# Patient Record
Sex: FEMALE | Race: WHITE | NOT HISPANIC OR LATINO | URBAN - NONMETROPOLITAN AREA
[De-identification: names, ages, dates, MRNs, and addresses within clinical notes are randomized per-mention and may not be internally consistent; named-entity substitution may affect disease eponyms.]

---

## 2025-02-24 DIAGNOSIS — R91.1 LUNG NODULE: ICD-10-CM

## 2025-02-24 NOTE — PROGRESS NOTES
Bronchoscopy Scheduling Request    Pre-bronchoscopy visit: New patient visit with Bronchoscopy group provider  Please schedule procedure: ASAP    Cytology on-site:  Yes  Location:  Either location  Performing physician:  Advanced diagnostic bronchoscopist  Referring physician:   Brandie Cordova MD , Noah Martinez DO  Indication:  LAD, lung nodules   Sedation / Anesthesia:  GA  Procedure:  Dx EBUS, Staging EBUS  Time:  Tier 2  Fluorscopy:   No  Imaging needed:  None  Labs:  CBC, BMP; please see if had labs a Adonis that can be used for pre-procedure evaluation   Meds:  None  Special Considerations:  None Please clarify medication list.  History of prior non IP group non diagnostic EBUS.  Consider malignancy workup, core biopsies, other etiologies for LAD.    Reviewed by:  Bianca Gee MD

## 2025-02-27 DIAGNOSIS — Z01.818 PRE-OP TESTING: ICD-10-CM

## 2025-02-27 DIAGNOSIS — R91.1 LUNG NODULE: ICD-10-CM

## 2025-03-06 ENCOUNTER — APPOINTMENT (OUTPATIENT)
Dept: PULMONOLOGY | Facility: CLINIC | Age: 80
End: 2025-03-06
Payer: MEDICARE

## 2025-03-06 VITALS — BODY MASS INDEX: 38.45 KG/M2 | WEIGHT: 245 LBS | HEIGHT: 67 IN

## 2025-03-06 DIAGNOSIS — Z01.811 PREOP PULMONARY/RESPIRATORY EXAM: Primary | ICD-10-CM

## 2025-03-06 DIAGNOSIS — R91.8 LUNG NODULES: ICD-10-CM

## 2025-03-06 PROCEDURE — 1159F MED LIST DOCD IN RCRD: CPT | Performed by: INTERNAL MEDICINE

## 2025-03-06 PROCEDURE — 99213 OFFICE O/P EST LOW 20 MIN: CPT | Performed by: INTERNAL MEDICINE

## 2025-03-06 PROCEDURE — 1036F TOBACCO NON-USER: CPT | Performed by: INTERNAL MEDICINE

## 2025-03-06 RX ORDER — AMLODIPINE BESYLATE 10 MG/1
0.5 TABLET ORAL
COMMUNITY
Start: 2024-11-26

## 2025-03-06 RX ORDER — METFORMIN HYDROCHLORIDE 500 MG/1
500 TABLET ORAL
COMMUNITY

## 2025-03-06 RX ORDER — SITAGLIPTIN 100 MG/1
100 TABLET, FILM COATED ORAL DAILY
COMMUNITY

## 2025-03-06 RX ORDER — ACETAMINOPHEN 325 MG/1
325 TABLET ORAL EVERY 4 HOURS PRN
COMMUNITY

## 2025-03-06 RX ORDER — GLIMEPIRIDE 2 MG/1
1 TABLET ORAL
COMMUNITY
Start: 2024-11-22

## 2025-03-06 RX ORDER — POTASSIUM CHLORIDE 750 MG/1
20 CAPSULE, EXTENDED RELEASE ORAL 2 TIMES DAILY
COMMUNITY

## 2025-03-06 RX ORDER — HYDROCHLOROTHIAZIDE 12.5 MG/1
12.5 TABLET ORAL DAILY
COMMUNITY

## 2025-03-06 RX ORDER — METOPROLOL SUCCINATE 25 MG/1
25 TABLET, EXTENDED RELEASE ORAL DAILY
COMMUNITY

## 2025-03-06 RX ORDER — AMOXICILLIN 500 MG/1
CAPSULE ORAL
COMMUNITY
Start: 2024-04-01

## 2025-03-06 RX ORDER — AZELASTINE 1 MG/ML
2 SPRAY, METERED NASAL 2 TIMES DAILY
COMMUNITY

## 2025-03-06 RX ORDER — LISINOPRIL 40 MG/1
40 TABLET ORAL DAILY
COMMUNITY

## 2025-03-06 NOTE — PROGRESS NOTES
Subjective   Patient ID: Gala Marie is a 79 y.o. female who presents for pre bronchoscopy.  HPI  Patient was contacted by telephone at her current residence.  I reviewed with her the scheduling which is for bronchoscopy to be done at 3/14/2025 and she is to arrive there at [facility] [address] at 10:30 AM.  The actual endoscopy suite is in the [facility] room 1300.  Patient reports that she never smoked and rarely drinks any alcohol.  She is familiar with where the hospital is located.  Based on the PET scan results there are 2 lung nodules noted.  The subcarinal lymph node and the other is a  Left lingular subcentimeter pulmonary nodule.  The patient denies having a sore throat, hemoptysis or fever.  She also has not had any issues with angina or recent myocardial infarction or heart failure.  She also denies any recent exposure to pneumonia and no contact with COVID infections.  I answered all the patient's questions to her satisfaction.  Review of Systems  No change  Objective   Physical Exam  Not done today.  Assessment/Plan        1.  Preop pulmonary/respiratory exam.  2.  Lung nodules.        This note was transcribed using the Dragon Dictation system.  There may be grammatical, punctuation, or verbiage errors that occur with voice recognition programs.  Seamus Arrington,  03/06/25 3:52 PM

## 2025-03-06 NOTE — H&P (VIEW-ONLY)
Subjective   Patient ID: Gala Marie is a 79 y.o. female who presents for pre bronchoscopy.  HPI  Patient was contacted by telephone at her current residence.  I reviewed with her the scheduling which is for bronchoscopy to be done at 3/14/2025 and she is to arrive there at [facility] [address] at 10:30 AM.  The actual endoscopy suite is in the [facility] room 1300.  Patient reports that she never smoked and rarely drinks any alcohol.  She is familiar with where the hospital is located.  Based on the PET scan results there are 2 lung nodules noted.  The subcarinal lymph node and the other is a  Left lingular subcentimeter pulmonary nodule.  The patient denies having a sore throat, hemoptysis or fever.  She also has not had any issues with angina or recent myocardial infarction or heart failure.  She also denies any recent exposure to pneumonia and no contact with COVID infections.  I answered all the patient's questions to her satisfaction.  Review of Systems  No change  Objective   Physical Exam  Not done today.  Assessment/Plan        1.  Preop pulmonary/respiratory exam.  2.  Lung nodules.        This note was transcribed using the Dragon Dictation system.  There may be grammatical, punctuation, or verbiage errors that occur with voice recognition programs.  Seamus Arrington,  03/06/25 3:52 PM

## 2025-03-13 ENCOUNTER — ANESTHESIA EVENT (OUTPATIENT)
Dept: GASTROENTEROLOGY | Facility: HOSPITAL | Age: 80
End: 2025-03-13
Payer: MEDICARE

## 2025-03-13 PROBLEM — G47.33 OSA (OBSTRUCTIVE SLEEP APNEA): Status: ACTIVE | Noted: 2025-03-13

## 2025-03-13 PROBLEM — E11.9 DIABETES MELLITUS, TYPE 2 (MULTI): Status: ACTIVE | Noted: 2025-03-13

## 2025-03-13 PROBLEM — E78.5 HYPERLIPIDEMIA: Status: ACTIVE | Noted: 2025-03-13

## 2025-03-13 NOTE — ANESTHESIA PREPROCEDURE EVALUATION
Patient: Gala Marie    Procedure Information       Date/Time: 03/14/25 1130    Scheduled providers: Cyrus Thapa MD    Procedure: BRONCHOSCOPY    Location: [facility]            Relevant Problems   Anesthesia (within normal limits)  No family hx MH      Cardiac   (+) HTN (hypertension)   (+) Hyperlipidemia      Pulmonary (within normal limits)      Neuro (within normal limits)      GI (within normal limits)      /Renal   (+) Renal calculi      Liver (within normal limits)      Endocrine   (+) Diabetes mellitus, type 2 (Multi) (255)      Hematology (within normal limits)      Musculoskeletal (within normal limits)      HEENT (within normal limits)      ID (within normal limits)      Skin (within normal limits)      GYN (within normal limits)       Clinical information reviewed:                   NPO Detail:  No data recorded     Physical Exam    Airway  Mallampati: III  TM distance: >3 FB  Neck ROM: full     Cardiovascular - normal exam     Dental   Comments: intact   Pulmonary - normal exam     Abdominal          Vitals:    03/14/25 1017   BP: (!) 185/83   Pulse: 84   Resp: 17   Temp: 36.4 °C (97.5 °F)   SpO2: 96%       Past Surgical History:   Procedure Laterality Date    OTHER SURGICAL HISTORY  03/10/2022    Hysterectomy     Past Medical History:   Diagnosis Date    Diabetes mellitus (Multi)     Hyperlipidemia     Hypertension        Current Outpatient Medications:     acetaminophen (TylenoL) 325 mg tablet, Take 1 tablet (325 mg) by mouth every 4 hours if needed., Disp: , Rfl:     amLODIPine (Norvasc) 10 mg tablet, Take 0.5 tablets (5 mg) by mouth early in the morning.., Disp: , Rfl:     glimepiride (Amaryl) 2 mg tablet, Take 1 tablet (2 mg) by mouth early in the morning.., Disp: , Rfl:     hydroCHLOROthiazide (Microzide) 12.5 mg tablet, Take 1 tablet (12.5 mg) by mouth once daily., Disp: , Rfl:     Januvia 100 mg tablet, Take 1 tablet (100 mg) by mouth once daily., Disp: , Rfl:      lisinopril 40 mg tablet, Take 1 tablet (40 mg) by mouth once daily., Disp: , Rfl:     metFORMIN (Glucophage) 500 mg tablet, Take 1 tablet (500 mg) by mouth once daily., Disp: , Rfl:     metoprolol succinate XL (Toprol-XL) 25 mg 24 hr tablet, Take 1 tablet (25 mg) by mouth once daily. Do not crush or chew., Disp: , Rfl:     potassium chloride ER (Micro-K) 10 mEq ER capsule, Take 2 capsules (20 mEq) by mouth 2 times a day., Disp: , Rfl:     amoxicillin (Amoxil) 500 mg capsule, take 4 capsules by mouth AS A ONE TIME DOSE 1 HOUR PRIOR TO PROCEDURE (Patient not taking: Reported on 3/6/2025), Disp: , Rfl:     azelastine (Astelin) 137 mcg (0.1 %) nasal spray, Administer 2 sprays into each nostril 2 times a day., Disp: , Rfl:   Prior to Admission medications    Medication Sig Start Date End Date Taking? Authorizing Provider   acetaminophen (TylenoL) 325 mg tablet Take 1 tablet (325 mg) by mouth every 4 hours if needed.   Yes Historical Provider, MD   amLODIPine (Norvasc) 10 mg tablet Take 0.5 tablets (5 mg) by mouth early in the morning.. 11/26/24  Yes Historical Provider, MD   glimepiride (Amaryl) 2 mg tablet Take 1 tablet (2 mg) by mouth early in the morning.. 11/22/24  Yes Historical Provider, MD   hydroCHLOROthiazide (Microzide) 12.5 mg tablet Take 1 tablet (12.5 mg) by mouth once daily.   Yes Historical Provider, MD   Januvia 100 mg tablet Take 1 tablet (100 mg) by mouth once daily.   Yes Historical Provider, MD   lisinopril 40 mg tablet Take 1 tablet (40 mg) by mouth once daily.   Yes Historical Provider, MD   metFORMIN (Glucophage) 500 mg tablet Take 1 tablet (500 mg) by mouth once daily.   Yes Historical Provider, MD   metoprolol succinate XL (Toprol-XL) 25 mg 24 hr tablet Take 1 tablet (25 mg) by mouth once daily. Do not crush or chew.   Yes Historical Provider, MD   potassium chloride ER (Micro-K) 10 mEq ER capsule Take 2 capsules (20 mEq) by mouth 2 times a day.   Yes Historical Provider, MD   amoxicillin  "(Amoxil) 500 mg capsule take 4 capsules by mouth AS A ONE TIME DOSE 1 HOUR PRIOR TO PROCEDURE  Patient not taking: Reported on 3/6/2025 4/1/24   Historical Provider, MD   azelastine (Astelin) 137 mcg (0.1 %) nasal spray Administer 2 sprays into each nostril 2 times a day.    Historical Provider, MD     Allergies   Allergen Reactions    Doxycycline Other     Social History     Tobacco Use    Smoking status: Never    Smokeless tobacco: Never   Substance Use Topics    Alcohol use: Not Currently     Comment: occasionally         Chemistry    No results found for: \"NA\", \"K\", \"CL\", \"CO2\", \"BUN\", \"CREATININE\", \"GLU\" No results found for: \"CALCIUM\", \"ALKPHOS\", \"AST\", \"ALT\", \"BILITOT\"       No results found for: \"WBC\", \"HGB\", \"HCT\", \"PLT\"  No results found for: \"PROTIME\", \"PTT\", \"INR\"  No results found for this or any previous visit (from the past 4464 hours).  No results found for this or any previous visit from the past 1095 days.     Anesthesia Plan    History of general anesthesia?: yes  History of complications of general anesthesia?: no    ASA 3     general     intravenous induction   Trial extubation is planned.  Anesthetic plan and risks discussed with patient.  Use of blood products discussed with patient who consented to blood products.    Plan discussed with CAA and CRNA.      "

## 2025-03-14 ENCOUNTER — ANESTHESIA (OUTPATIENT)
Dept: GASTROENTEROLOGY | Facility: HOSPITAL | Age: 80
End: 2025-03-14
Payer: MEDICARE

## 2025-03-14 ENCOUNTER — HOSPITAL ENCOUNTER (OUTPATIENT)
Dept: GASTROENTEROLOGY | Facility: HOSPITAL | Age: 80
Discharge: HOME | End: 2025-03-14
Payer: MEDICARE

## 2025-03-14 VITALS
SYSTOLIC BLOOD PRESSURE: 164 MMHG | BODY MASS INDEX: 37.03 KG/M2 | HEART RATE: 65 BPM | OXYGEN SATURATION: 97 % | DIASTOLIC BLOOD PRESSURE: 68 MMHG | TEMPERATURE: 96.8 F | HEIGHT: 69 IN | RESPIRATION RATE: 16 BRPM | WEIGHT: 250 LBS

## 2025-03-14 DIAGNOSIS — R91.1 LUNG NODULE: ICD-10-CM

## 2025-03-14 PROBLEM — N20.0 RENAL CALCULI: Status: ACTIVE | Noted: 2025-03-14

## 2025-03-14 PROBLEM — I10 HTN (HYPERTENSION): Status: ACTIVE | Noted: 2025-03-14

## 2025-03-14 LAB — GLUCOSE BLD MANUAL STRIP-MCNC: 255 MG/DL (ref 74–99)

## 2025-03-14 PROCEDURE — 31652 BRONCH EBUS SAMPLNG 1/2 NODE: CPT | Performed by: INTERNAL MEDICINE

## 2025-03-14 PROCEDURE — 7100000010 HC PHASE TWO TIME - EACH INCREMENTAL 1 MINUTE

## 2025-03-14 PROCEDURE — 3700000001 HC GENERAL ANESTHESIA TIME - INITIAL BASE CHARGE

## 2025-03-14 PROCEDURE — 2720000007 HC OR 272 NO HCPCS

## 2025-03-14 PROCEDURE — 7100000002 HC RECOVERY ROOM TIME - EACH INCREMENTAL 1 MINUTE

## 2025-03-14 PROCEDURE — 7100000001 HC RECOVERY ROOM TIME - INITIAL BASE CHARGE

## 2025-03-14 PROCEDURE — 2500000004 HC RX 250 GENERAL PHARMACY W/ HCPCS (ALT 636 FOR OP/ED): Mod: TB

## 2025-03-14 PROCEDURE — 82947 ASSAY GLUCOSE BLOOD QUANT: CPT

## 2025-03-14 PROCEDURE — 7100000009 HC PHASE TWO TIME - INITIAL BASE CHARGE

## 2025-03-14 PROCEDURE — 3700000002 HC GENERAL ANESTHESIA TIME - EACH INCREMENTAL 1 MINUTE

## 2025-03-14 RX ORDER — GLYCOPYRROLATE 0.2 MG/ML
INJECTION INTRAMUSCULAR; INTRAVENOUS AS NEEDED
Status: DISCONTINUED | OUTPATIENT
Start: 2025-03-14 | End: 2025-03-14

## 2025-03-14 RX ORDER — ROCURONIUM BROMIDE 10 MG/ML
INJECTION, SOLUTION INTRAVENOUS AS NEEDED
Status: DISCONTINUED | OUTPATIENT
Start: 2025-03-14 | End: 2025-03-14

## 2025-03-14 RX ORDER — FENTANYL CITRATE 50 UG/ML
INJECTION, SOLUTION INTRAMUSCULAR; INTRAVENOUS AS NEEDED
Status: DISCONTINUED | OUTPATIENT
Start: 2025-03-14 | End: 2025-03-14

## 2025-03-14 RX ORDER — PROPOFOL 10 MG/ML
INJECTION, EMULSION INTRAVENOUS CONTINUOUS PRN
Status: DISCONTINUED | OUTPATIENT
Start: 2025-03-14 | End: 2025-03-14

## 2025-03-14 RX ORDER — MIDAZOLAM HYDROCHLORIDE 1 MG/ML
INJECTION, SOLUTION INTRAMUSCULAR; INTRAVENOUS AS NEEDED
Status: DISCONTINUED | OUTPATIENT
Start: 2025-03-14 | End: 2025-03-14

## 2025-03-14 RX ORDER — ONDANSETRON HYDROCHLORIDE 2 MG/ML
INJECTION, SOLUTION INTRAVENOUS AS NEEDED
Status: DISCONTINUED | OUTPATIENT
Start: 2025-03-14 | End: 2025-03-14

## 2025-03-14 RX ORDER — LIDOCAINE HYDROCHLORIDE 20 MG/ML
INJECTION, SOLUTION INFILTRATION; PERINEURAL AS NEEDED
Status: DISCONTINUED | OUTPATIENT
Start: 2025-03-14 | End: 2025-03-14

## 2025-03-14 RX ADMIN — PROPOFOL 150 MG: 10 INJECTION, EMULSION INTRAVENOUS at 12:04

## 2025-03-14 RX ADMIN — LIDOCAINE HYDROCHLORIDE 100 MG: 20 INJECTION, SOLUTION INFILTRATION; PERINEURAL at 12:04

## 2025-03-14 RX ADMIN — PROPOFOL 50 MG: 10 INJECTION, EMULSION INTRAVENOUS at 12:47

## 2025-03-14 RX ADMIN — PROPOFOL 20 MG: 10 INJECTION, EMULSION INTRAVENOUS at 12:40

## 2025-03-14 RX ADMIN — MIDAZOLAM 1 MG: 1 INJECTION INTRAMUSCULAR; INTRAVENOUS at 12:00

## 2025-03-14 RX ADMIN — DEXAMETHASONE SODIUM PHOSPHATE 8 MG: 4 INJECTION INTRA-ARTICULAR; INTRALESIONAL; INTRAMUSCULAR; INTRAVENOUS; SOFT TISSUE at 12:20

## 2025-03-14 RX ADMIN — ROCURONIUM 50 MG: 50 INJECTION, SOLUTION INTRAVENOUS at 12:05

## 2025-03-14 RX ADMIN — ONDANSETRON 4 MG: 2 INJECTION, SOLUTION INTRAMUSCULAR; INTRAVENOUS at 12:25

## 2025-03-14 RX ADMIN — SUGAMMADEX 400 MG: 100 INJECTION, SOLUTION INTRAVENOUS at 12:55

## 2025-03-14 RX ADMIN — PROPOFOL 20 MG: 10 INJECTION, EMULSION INTRAVENOUS at 12:42

## 2025-03-14 RX ADMIN — GLYCOPYRROLATE 0.1 MG: 0.2 INJECTION INTRAMUSCULAR; INTRAVENOUS at 12:10

## 2025-03-14 RX ADMIN — SODIUM CHLORIDE, SODIUM LACTATE, POTASSIUM CHLORIDE, AND CALCIUM CHLORIDE: 600; 310; 30; 20 INJECTION, SOLUTION INTRAVENOUS at 12:04

## 2025-03-14 RX ADMIN — FENTANYL CITRATE 50 MCG: 50 INJECTION, SOLUTION INTRAMUSCULAR; INTRAVENOUS at 12:04

## 2025-03-14 RX ADMIN — PROPOFOL 150 MCG/KG/MIN: 10 INJECTION, EMULSION INTRAVENOUS at 12:05

## 2025-03-14 ASSESSMENT — COLUMBIA-SUICIDE SEVERITY RATING SCALE - C-SSRS
6. HAVE YOU EVER DONE ANYTHING, STARTED TO DO ANYTHING, OR PREPARED TO DO ANYTHING TO END YOUR LIFE?: NO
2. HAVE YOU ACTUALLY HAD ANY THOUGHTS OF KILLING YOURSELF?: NO
1. IN THE PAST MONTH, HAVE YOU WISHED YOU WERE DEAD OR WISHED YOU COULD GO TO SLEEP AND NOT WAKE UP?: NO

## 2025-03-14 ASSESSMENT — PAIN - FUNCTIONAL ASSESSMENT
PAIN_FUNCTIONAL_ASSESSMENT: 0-10

## 2025-03-14 ASSESSMENT — PAIN SCALES - GENERAL
PAIN_LEVEL: 1
PAINLEVEL_OUTOF10: 0 - NO PAIN
PAINLEVEL_OUTOF10: 2
PAINLEVEL_OUTOF10: 0 - NO PAIN

## 2025-03-14 NOTE — ANESTHESIA PROCEDURE NOTES
Airway  Date/Time: 3/14/2025 12:12 PM  Urgency: elective    Airway not difficult    Staffing  Performed: DANII   Authorized by: DANII Pappas    Performed by: DANII Pappas  Patient location during procedure: OR    Indications and Patient Condition  Indications for airway management: anesthesia  Spontaneous Ventilation: absent  Sedation level: deep  Preoxygenated: yes  Patient position: sniffing  MILS maintained throughout  Mask difficulty assessment: 2 - vent by mask + OA or adjuvant +/- NMBA    Final Airway Details  Final airway type: endotracheal airway      Successful airway: ETT  Cuffed: yes   Successful intubation technique: direct laryngoscopy  Facilitating devices/methods: intubating stylet  Endotracheal tube insertion site: oral  Blade: Milagro  Blade size: #3  ETT size (mm): 8.5  Cormack-Lehane Classification: grade I - full view of glottis  Placement verified by: chest auscultation and capnometry   Measured from: lips  ETT to lips (cm): 21  Number of attempts at approach: 1

## 2025-03-14 NOTE — DISCHARGE INSTRUCTIONS
The anesthetics, sedatives or narcotics which were given to you today will be acting in your body for the next 24 hours, so you might feel a little sleepy or groggy. This feeling should slowly wear off.   Carefully read and follow the instructions below:   You received sedation today.   Do not drive or operate machinery or power tools of any kind.   No alcoholic beverages today, not even beer or wine.   No over the counter medications that contain alcohol or may cause drowsiness.   Do not make important decisions or sign legal documents.     Do not use Aspirin containing products or non-steroidal medications for the next 24 hours.  (Examples of these types of medications include: Advil, Aleve, Ecotrin, Ibuprofen, Motrin or Naprosyn.  This list is not all-inclusive.  Check with your physician or pharmacist before resuming these medications.)  Tylenol, cough medicine, cough drops or throat lozenges may be used when you are allowed to resume eating and drinking.     Call your physician if any of these symptoms occur:   High fever over 101 degrees or chills (a low grade fever is common for 24 hours)   Rash or hives   Persistent nausea or vomiting   Inability to urinate within 8 hours after the procedure  Go directly to the emergency room if you notice any of the following:   Shortness of breath   Chest pain  Coughing up large amounts of bright red blood greater than a teaspoonful of blood clots (about a teaspoonful for the next 24-48 hours is normal, especially if you had a biopsy)  Resume all normal medications unless directed otherwise by your doctor.       Follow up with your referring physician as previously scheduled.    If you experience any problems or have any questions following discharge, please call:   Before 5 pm: [phone]   After 5pm and on weekends: [phone] / [phone] and ask for the Pulmonary Fellow on-call (Pager Number: [phone])

## 2025-03-14 NOTE — ANESTHESIA POSTPROCEDURE EVALUATION
Patient: Gala Marie    Procedure Summary       Date: 03/14/25 Room / Location: [facility]    Anesthesia Start: 1149 Anesthesia Stop: 1313    Procedure: BRONCHOSCOPY Diagnosis: Lung nodule    Scheduled Providers: Cyrus Thapa MD Responsible Provider: Laverne Kumar MD    Anesthesia Type: general ASA Status: 3            Anesthesia Type: general    Vitals Value Taken Time   /74 03/14/25 1328   Temp 36 °C (96.8 °F) 03/14/25 1310   Pulse 73 03/14/25 1328   Resp 16 03/14/25 1328   SpO2 98 % 03/14/25 1328       Anesthesia Post Evaluation    Patient location during evaluation: PACU  Patient participation: complete - patient participated  Level of consciousness: awake  Pain score: 1  Pain management: adequate  Multimodal analgesia pain management approach  Airway patency: patent  Two or more strategies used to mitigate risk of obstructive sleep apnea  Cardiovascular status: acceptable  Respiratory status: acceptable, airway suctioned and face mask  Hydration status: acceptable  Postoperative Nausea and Vomiting: none    No notable events documented.

## 2025-03-14 NOTE — LETTER
Gala Mistry      2/28/2025                                              INFORMATION FOR YOUR PROCEDURE     INSTRUCTIONS MUST BE FOLLOWED OR YOU RUN THE RISK OF YOUR CASE BEING CANCELED     Information is attached to this e-mail for your upcoming procedure. (Look for the paperclip in your email to access this information)  Lab requisitions are also included as well as procedural instructions.    You are scheduled for your Bronchoscopy on   3/14/25,   with Dr. Cyrus Thapa    Arrival is at  10:30  AM,  for Check In prior to your actual procedure time. This allows us to prepare you for your procedure.  Location:   [place] [address]   Bronchoscopy / Endoscopy Suite   [facility] Room 1300.     Located on the 1st Floor close to the Main entrance of the hospital.  Enter through the front doors, turn left and we are the 1st hallway on the left hand side.(Room 1300 [facility]).  If you park in the visitor's garage you will come in on the second floor and will need to make your way down to the 1st level.     Patient information is right there if assistance is needed.    NPO (No food or drink) after midnight the night before your procedure. This includes coffee, water, and soda, hard candy, gum or mints.  If taking your morning medications, a small sip of water is allowed to get the medication down.    For your safety, you must have a responsible, adult  accompany you to your procedure.  You will not be permitted to drive yourself home if you have received any type of anesthesia or sedation.    Automated calls about your upcoming scheduled appointments can be quite confusing for patients.    The times may vary depending on what you have scheduled for procedure day. Follow the time/s I have given you  so there is no confusion.    Blood work will need to be done prior to your procedure, preferably at a  Facility.  There are  no restrictions for testing. I have attached a requisition for you.    Dr. Seamus Arrington, will call you on 3/6/25, @ 3:30 PM    This is a phone visit to go over your medical history prior to your procedure, and is a necessary part of your workup.  The patient must be present at this phone visit.    Please reach out to me with any questions you may have Zuri @ [phone] or   Gus @ [phone]    If you have an emergency (weather or other) on the day of your scheduled procedure and need to cancel for any reason, Please call the Endoscopy Suite @ [phone],  Otherwise, call Zuri @ [phone]    Have a nice day!    Zuri Steward    Bronchoscopy   Interventional Pulmonology    MD Satya Lujan MD Sameer Avasarala, MD Catalina Teba, MD Andrew Dunatchik, MD Sruti Brahmandam, MD        [facility]  Pulmonary, Critical Care and Sleep Medicine  [address] [place] [address] [phone]   [phone]  [email or web address]

## 2025-03-24 LAB
CELL COUNT (BLOOD): 0.6 X10*3/UL
CELL POPULATIONS: NORMAL
DIAGNOSIS: NORMAL
FLOW DIFFERENTIAL: NORMAL
FLOW TEST ORDERED: NORMAL
LAB AP ASR DISCLAIMER: NORMAL
LAB TEST METHOD: NORMAL
LABORATORY COMMENT REPORT: NORMAL
LABORATORY COMMENT REPORT: NORMAL
NUMBER OF CELLS COLLECTED: NORMAL
PATH REPORT.FINAL DX SPEC: NORMAL
PATH REPORT.GROSS SPEC: NORMAL
PATH REPORT.INTRAOP OBS SPEC DOC: NORMAL
PATH REPORT.RELEVANT HX SPEC: NORMAL
PATH REPORT.TOTAL CANCER: NORMAL
PATH REPORT.TOTAL CANCER: NORMAL
RESIDENT REVIEW: NORMAL
SIGNATURE COMMENT: NORMAL
SPECIMEN VIABILITY: NORMAL